# Patient Record
Sex: MALE | Race: WHITE | Employment: UNEMPLOYED | URBAN - NONMETROPOLITAN AREA
[De-identification: names, ages, dates, MRNs, and addresses within clinical notes are randomized per-mention and may not be internally consistent; named-entity substitution may affect disease eponyms.]

---

## 2020-08-28 ENCOUNTER — HOSPITAL ENCOUNTER (INPATIENT)
Age: 30
LOS: 2 days | Discharge: HOME | DRG: 885 | End: 2020-08-31
Attending: PSYCHIATRY & NEUROLOGY | Admitting: PSYCHIATRY & NEUROLOGY
Payer: COMMERCIAL

## 2020-08-28 LAB
ACETAMINOPHEN LEVEL: < 5 UG/ML (ref 0–20)
ALBUMIN SERPL-MCNC: 4.9 G/DL (ref 3.5–5.1)
ALP BLD-CCNC: 74 U/L (ref 38–126)
ALT SERPL-CCNC: 10 U/L (ref 11–66)
ANION GAP SERPL CALCULATED.3IONS-SCNC: 11 MEQ/L (ref 8–16)
AST SERPL-CCNC: 20 U/L (ref 5–40)
BASOPHILS # BLD: 0.4 %
BASOPHILS ABSOLUTE: 0 THOU/MM3 (ref 0–0.1)
BILIRUB SERPL-MCNC: 0.6 MG/DL (ref 0.3–1.2)
BUN BLDV-MCNC: 12 MG/DL (ref 7–22)
CALCIUM SERPL-MCNC: 9.7 MG/DL (ref 8.5–10.5)
CHLORIDE BLD-SCNC: 102 MEQ/L (ref 98–111)
CO2: 24 MEQ/L (ref 23–33)
CREAT SERPL-MCNC: 0.9 MG/DL (ref 0.4–1.2)
EOSINOPHIL # BLD: 1.2 %
EOSINOPHILS ABSOLUTE: 0.1 THOU/MM3 (ref 0–0.4)
ERYTHROCYTE [DISTWIDTH] IN BLOOD BY AUTOMATED COUNT: 12.4 % (ref 11.5–14.5)
ERYTHROCYTE [DISTWIDTH] IN BLOOD BY AUTOMATED COUNT: 43.9 FL (ref 35–45)
ETHYL ALCOHOL, SERUM: < 0.01 %
GFR SERPL CREATININE-BSD FRML MDRD: > 90 ML/MIN/1.73M2
GLUCOSE BLD-MCNC: 108 MG/DL (ref 70–108)
HCT VFR BLD CALC: 43.6 % (ref 42–52)
HEMOGLOBIN: 14.8 GM/DL (ref 14–18)
IMMATURE GRANS (ABS): 0.01 THOU/MM3 (ref 0–0.07)
IMMATURE GRANULOCYTES: 0.1 %
LYMPHOCYTES # BLD: 21.2 %
LYMPHOCYTES ABSOLUTE: 1.4 THOU/MM3 (ref 1–4.8)
MCH RBC QN AUTO: 32.7 PG (ref 26–33)
MCHC RBC AUTO-ENTMCNC: 33.9 GM/DL (ref 32.2–35.5)
MCV RBC AUTO: 96.5 FL (ref 80–94)
MONOCYTES # BLD: 5.9 %
MONOCYTES ABSOLUTE: 0.4 THOU/MM3 (ref 0.4–1.3)
NUCLEATED RED BLOOD CELLS: 0 /100 WBC
OSMOLALITY CALCULATION: 274.1 MOSMOL/KG (ref 275–300)
PLATELET # BLD: 237 THOU/MM3 (ref 130–400)
PMV BLD AUTO: 9.6 FL (ref 9.4–12.4)
POTASSIUM REFLEX MAGNESIUM: 3.9 MEQ/L (ref 3.5–5.2)
RBC # BLD: 4.52 MILL/MM3 (ref 4.7–6.1)
SALICYLATE, SERUM: < 0.3 MG/DL (ref 2–10)
SEG NEUTROPHILS: 71.2 %
SEGMENTED NEUTROPHILS ABSOLUTE COUNT: 4.8 THOU/MM3 (ref 1.8–7.7)
SODIUM BLD-SCNC: 137 MEQ/L (ref 135–145)
TOTAL CK: 126 U/L (ref 55–170)
TOTAL PROTEIN: 7.4 G/DL (ref 6.1–8)
TROPONIN T: < 0.01 NG/ML
TSH SERPL DL<=0.05 MIU/L-ACNC: 0.62 UIU/ML (ref 0.4–4.2)
WBC # BLD: 6.7 THOU/MM3 (ref 4.8–10.8)

## 2020-08-28 PROCEDURE — 82550 ASSAY OF CK (CPK): CPT

## 2020-08-28 PROCEDURE — 84484 ASSAY OF TROPONIN QUANT: CPT

## 2020-08-28 PROCEDURE — 85025 COMPLETE CBC W/AUTO DIFF WBC: CPT

## 2020-08-28 PROCEDURE — 80053 COMPREHEN METABOLIC PANEL: CPT

## 2020-08-28 PROCEDURE — G0480 DRUG TEST DEF 1-7 CLASSES: HCPCS

## 2020-08-28 PROCEDURE — 36415 COLL VENOUS BLD VENIPUNCTURE: CPT

## 2020-08-28 PROCEDURE — 84443 ASSAY THYROID STIM HORMONE: CPT

## 2020-08-28 PROCEDURE — 99284 EMERGENCY DEPT VISIT MOD MDM: CPT

## 2020-08-28 PROCEDURE — 99283 EMERGENCY DEPT VISIT LOW MDM: CPT

## 2020-08-29 ENCOUNTER — APPOINTMENT (OUTPATIENT)
Dept: CT IMAGING | Age: 30
DRG: 885 | End: 2020-08-29
Payer: COMMERCIAL

## 2020-08-29 PROBLEM — F29 PSYCHOSIS (HCC): Status: ACTIVE | Noted: 2020-08-29

## 2020-08-29 LAB
AMPHETAMINE+METHAMPHETAMINE URINE SCREEN: NEGATIVE
BARBITURATE QUANTITATIVE URINE: NEGATIVE
BENZODIAZEPINE QUANTITATIVE URINE: NEGATIVE
BILIRUBIN URINE: NEGATIVE
BLOOD, URINE: NEGATIVE
CANNABINOID QUANTITATIVE URINE: POSITIVE
CHARACTER, URINE: CLEAR
COCAINE METABOLITE QUANTITATIVE URINE: NEGATIVE
COLOR: YELLOW
GLUCOSE URINE: NEGATIVE MG/DL
KETONES, URINE: ABNORMAL
LEUKOCYTE ESTERASE, URINE: NEGATIVE
NITRITE, URINE: NEGATIVE
OPIATES, URINE: NEGATIVE
OXYCODONE: NEGATIVE
PH UA: 6.5 (ref 5–9)
PHENCYCLIDINE QUANTITATIVE URINE: NEGATIVE
PROTEIN UA: NEGATIVE
SPECIFIC GRAVITY, URINE: 1.03 (ref 1–1.03)
UROBILINOGEN, URINE: 2 EU/DL (ref 0–1)

## 2020-08-29 PROCEDURE — 70450 CT HEAD/BRAIN W/O DYE: CPT

## 2020-08-29 PROCEDURE — 81003 URINALYSIS AUTO W/O SCOPE: CPT

## 2020-08-29 PROCEDURE — 80307 DRUG TEST PRSMV CHEM ANLYZR: CPT

## 2020-08-29 PROCEDURE — 6370000000 HC RX 637 (ALT 250 FOR IP): Performed by: NURSE PRACTITIONER

## 2020-08-29 PROCEDURE — 6370000000 HC RX 637 (ALT 250 FOR IP): Performed by: PSYCHIATRY & NEUROLOGY

## 2020-08-29 PROCEDURE — 1240000000 HC EMOTIONAL WELLNESS R&B

## 2020-08-29 PROCEDURE — 90792 PSYCH DIAG EVAL W/MED SRVCS: CPT | Performed by: NURSE PRACTITIONER

## 2020-08-29 RX ORDER — TRAZODONE HYDROCHLORIDE 50 MG/1
50 TABLET ORAL NIGHTLY PRN
Status: DISCONTINUED | OUTPATIENT
Start: 2020-08-29 | End: 2020-08-31 | Stop reason: HOSPADM

## 2020-08-29 RX ORDER — NICOTINE 21 MG/24HR
1 PATCH, TRANSDERMAL 24 HOURS TRANSDERMAL DAILY
Status: DISCONTINUED | OUTPATIENT
Start: 2020-08-29 | End: 2020-08-31 | Stop reason: HOSPADM

## 2020-08-29 RX ORDER — ACETAMINOPHEN 325 MG/1
650 TABLET ORAL EVERY 4 HOURS PRN
Status: DISCONTINUED | OUTPATIENT
Start: 2020-08-29 | End: 2020-08-31 | Stop reason: HOSPADM

## 2020-08-29 RX ORDER — LORAZEPAM 1 MG/1
0.5 TABLET ORAL ONCE
Status: DISCONTINUED | OUTPATIENT
Start: 2020-08-29 | End: 2020-08-31 | Stop reason: HOSPADM

## 2020-08-29 RX ORDER — HYDROXYZINE HYDROCHLORIDE 25 MG/1
50 TABLET, FILM COATED ORAL 3 TIMES DAILY PRN
Status: DISCONTINUED | OUTPATIENT
Start: 2020-08-29 | End: 2020-08-31 | Stop reason: HOSPADM

## 2020-08-29 RX ORDER — IBUPROFEN 400 MG/1
400 TABLET ORAL EVERY 6 HOURS PRN
Status: DISCONTINUED | OUTPATIENT
Start: 2020-08-29 | End: 2020-08-29

## 2020-08-29 RX ORDER — RISPERIDONE 1 MG/1
1 TABLET, FILM COATED ORAL 2 TIMES DAILY
Status: DISCONTINUED | OUTPATIENT
Start: 2020-08-29 | End: 2020-08-30

## 2020-08-29 RX ORDER — MAGNESIUM HYDROXIDE/ALUMINUM HYDROXICE/SIMETHICONE 120; 1200; 1200 MG/30ML; MG/30ML; MG/30ML
30 SUSPENSION ORAL EVERY 6 HOURS PRN
Status: DISCONTINUED | OUTPATIENT
Start: 2020-08-29 | End: 2020-08-31 | Stop reason: HOSPADM

## 2020-08-29 RX ADMIN — RISPERIDONE 1 MG: 1 TABLET ORAL at 21:17

## 2020-08-29 RX ADMIN — ACETAMINOPHEN 650 MG: 325 TABLET ORAL at 22:03

## 2020-08-29 RX ADMIN — HYDROXYZINE HYDROCHLORIDE 50 MG: 25 TABLET ORAL at 21:17

## 2020-08-29 RX ADMIN — TRAZODONE HYDROCHLORIDE 50 MG: 50 TABLET ORAL at 21:17

## 2020-08-29 ASSESSMENT — PAIN DESCRIPTION - PAIN TYPE: TYPE: ACUTE PAIN

## 2020-08-29 ASSESSMENT — PAIN DESCRIPTION - ORIENTATION: ORIENTATION: LEFT

## 2020-08-29 ASSESSMENT — PAIN DESCRIPTION - FREQUENCY: FREQUENCY: INTERMITTENT

## 2020-08-29 ASSESSMENT — PAIN SCALES - GENERAL
PAINLEVEL_OUTOF10: 0
PAINLEVEL_OUTOF10: 5

## 2020-08-29 ASSESSMENT — SLEEP AND FATIGUE QUESTIONNAIRES
DIFFICULTY FALLING ASLEEP: YES
SLEEP PATTERN: DIFFICULTY FALLING ASLEEP
RESTFUL SLEEP: NO
DIFFICULTY STAYING ASLEEP: NO
DO YOU USE A SLEEP AID: NO
DIFFICULTY ARISING: NO
DO YOU HAVE DIFFICULTY SLEEPING: YES

## 2020-08-29 ASSESSMENT — PAIN DESCRIPTION - PROGRESSION: CLINICAL_PROGRESSION: NOT CHANGED

## 2020-08-29 ASSESSMENT — PAIN DESCRIPTION - DESCRIPTORS: DESCRIPTORS: ACHING;DISCOMFORT

## 2020-08-29 ASSESSMENT — PAIN - FUNCTIONAL ASSESSMENT: PAIN_FUNCTIONAL_ASSESSMENT: ACTIVITIES ARE NOT PREVENTED

## 2020-08-29 ASSESSMENT — PAIN DESCRIPTION - ONSET: ONSET: ON-GOING

## 2020-08-29 ASSESSMENT — LIFESTYLE VARIABLES: HISTORY_ALCOHOL_USE: COMMENT

## 2020-08-29 ASSESSMENT — PAIN DESCRIPTION - LOCATION: LOCATION: RIB CAGE

## 2020-08-29 NOTE — BH NOTE
1300 -Pt came up to the nurses station irritable and wanting to see the doctor or someone to get him out of here. Pt was told that the doctor on call will be here later, and he demanded to be able to talk to him on the phone since he is on call. Pt reported that he does not have his keys or his phone, and the hotel room he was staying in is no longer available. Pt continued to say that he is here against his will, and was explained how [facility] [place] work, and why he was admitted to the unit. Pt continued to state that the  that brought him here ruined his life as now he is unable to contact his daughter and her mother who are in [facility]. Pt did not respond to redirection and became increasingly irritable.

## 2020-08-29 NOTE — PATIENT CARE CONFERENCE
[place]   6. Will need a return to work slip or FMLA paper completion?  No       GOALS UPDATE:   Time frame for Short-Term Goals: ongoing     [facility], [address]

## 2020-08-29 NOTE — H&P
Psychiatry H&P              CC: Unspecified Psychosis    HPI:  Belinda Irwin is a 27year old male presented to ED per law enforcement under [address] [place]. Client is some what of a poor historian. Was reported to be at friends and was acting strange and police were called and client was naked upon their arrival. Having VH. And paranoia. Was  Not sure if had wrecked car. Client states to  This provider he and girlfriend wnt to [place] to bau a puppy and are from [place]., and reports he was so tired and kept drinking \"Red Bulls\" states they were passing through [place] where he thinks they got a hotel. States he could not sleep prior to admission. States he did sleep  In ED. Belinda Irwin is alert and oriented to person, place and time. Upon admission to E4 psychiatric unit:   Belinda Irwin appears with disorganized thoughts. Whispering to self even though denies AH/VH. currently. Appears with paranoia and is irritable. Denies feelings of harm towards self ort others. Reports appetite is \"ok. \" Labs reviewed drawn 8-28-20 reflect no critical abnormals. UDS done today is positive  for cannabis only. Denies having any support systems. States his family in in [facility]. Belinda Irwin would not give name of town he is from in [place]. States he was in [facility] in past     Past Medical Hx:  See ED note   States he might be allergic to Ibuprofen Denies any other drug allergies     Past Psychiatric Hx:  Denies any past psych hosptaliztions. Reports one past suicidal gestures years ago. Reports being on Ritalin, Adderall,     Family Hx:  Denies any family Hx of mental health or addiction issues. Social Hx:  TOBACCO:  Denies use of tobacco products  ETOH: Denies use   DRUGS: Reports cannabis use vague on frequency   MARITAL STATUS: Currently   since 2017  OCCUPATION: Unemployed  LEVEL OF EDUCATION: Report shaving high school diploma   LIVING SITUATION: Reports lives in [place] alone. Reports being from [place]. Reports having one daughter in custody of mother. MSE:  Level of consciousness: Alert  Appearance: in chair and fair grooming   Behavior/Motor: Guarded  Attitude toward examiner: semicooperative   Speech: Normal volume, Circumstantial   Mood: Irritable   Affect: Blunt   Thought processes: Some disorganization noted  Suicidal Ideation: Denies suicidal ideations  Homicidal ideation: Denies homicidal ideations  Delusions: Paranoid  Perceptual Disturbance: Denies AH/VH currently whispers to self. Cognition: Oriented to person, place, time   Concentration fair   Memory intact   Insight: Limited   Judgment: Limited    ROS:  Constitutional: Appears well-developed; No acute distress  HENT:   Head: Normocephalic and atraumatic. Negative for ear pain, congestion, rhinorrhea and neck pain. Eyes: Conjunctivae are normal.  no discharge noted from eyes bilat. . No scleral icterus. Neck: Normal range of motion. Pulmonary/Chest:  No respiratory distress or accessory muscle use, no wheezing. Abdominal: No distension. Musculoskeletal: Normal range of motion. He exhibits no edema. Negative for myalgias, back pain and arthralgias. Neurological: cranial nerves II-XII grossly in tact, normal gait and station. Negative for dizziness, weakness or headaches. Skin: Skin is warm and dry. Not diaphoretic. No erythema. Cardiovascular: Negative for chest pain, palpitations and leg swelling. Gastrointestinal: Negative for nausea, vomiting and diarrhea. Genitourinary: Negative for dysuria and frequency. Impression:  Unspecified Psychosis       Plan:  Admit to 4 psychiatric unit for symptom stabilization and medication management. Introduce to unit milieu, groups and individual therapies.   Refuses offer of any medication   Start Risperdal 1mg po BID for psychosis        Behavioral Services  Medicare Certification     Admission Day 1  I certify that this patient's inpatient psychiatric hospital admission is medically necessary for:    (1) treatment which could reasonably be expected to improve this patient's condition, or    (2) diagnostic study or its equivalent.        Physicians Signature: Electronically signed by Sincere Thacker, FELIX 3-

## 2020-08-29 NOTE — GROUP NOTE
Group Therapy Note    Date: 8/29/2020    Group Start Time: 8433  Group End Time: 7219  Group Topic: [facility] Adult Psych 4E    [address], [address]    Group Therapy Note    Attendees: 3         Pt did not attend goal group and community meeting. Pt received maximum encouragement to attend group. Pt participated in small 1:1 session to identify a daily goal.  Pts goal is \"get in contact with my daughter/ family,  Get out of here. \"    Signature:  Rozina Sprague

## 2020-08-29 NOTE — ED NOTES
Unable to obtain EKG. Pt curled in a ball in the bed, rocking back and forth. Pt has short episodes of crying out. Pt is rubbing stomach and when asked if he was in pain, pt screams \"what do you want from me?\". Pt looking all around the room in different directions and when asked what he is seeing, pt states \"everything. Cameras, sounds, things\". Pt refusing to remove shirt and refusing EKG and registration's questions at this time.       Cory Shen  08/28/20 8078

## 2020-08-29 NOTE — ED NOTES
ED to inpatient nurses report    Chief Complaint   Patient presents with    Psychiatric Evaluation    Hallucinations      Present to ED from home  LOC: alert to only name  Vital signs   Vitals:    08/28/20 2120 08/29/20 0022   BP: 134/89 129/80   Pulse: 140 90   Resp: 20 19   Temp: 98.9 °F (37.2 °C)    TempSrc: Axillary    SpO2: 98% 98%      Oxygen Baseline 98 RA    Current needs required none Bipap/Cpap No  LDAs:    Mobility: Independent  Pending ED orders: none  Present condition: stable    Electronically signed by Delbert Carter on 8/29/2020 at 6:36 AM       Delbert Carter  08/29/20 9214

## 2020-08-29 NOTE — PROGRESS NOTES
Spoke with Dr. Rony Christine. Patient to be admitted for inpatient treatment. Informed patients nurse. 4E will call back for patient report.

## 2020-08-29 NOTE — PROGRESS NOTES
Behavioral Health   Admission Note     Admission Type:   Admission Type: Involuntary    Reason for admission:  Reason for Admission: \"I don't know why I am here. Saleem Parrawin Nings I need to leave\"    PATIENT STRENGTHS:  Strengths: No significant Physical Illness    Patient Strengths and Limitations:  Limitations: Inappropriate/potentially harmful leisure interests    Addictive Behavior:   Addictive Behavior  In the past 3 months, have you felt or has someone told you that you have a problem with:  : None  Do you have a history of Chemical Use?: Comment(pt uses cannabis per record pt will not elaborate on usage history)  Do you have a history of Alcohol Use?: Comment(denies/pt guarded)  Do you have a history of Street Drug Abuse?: Comment(history of cannabis use per record-pt poor historian)  Histroy of Prescripton Drug Abuse?: Comment(wilner)    Medical Problems:   Past Medical History:   Diagnosis Date    Psychiatric problem        Status EXAM:  Status and Exam  Normal: No  Facial Expression: Hostile  Affect: Constricted  Level of Consciousness: Confused  Mood:Normal: No  Mood: Suspicious  Motor Activity:Normal: No  Motor Activity: Agitated  Interview Behavior: Uncooperative/Withdrawn  Preception: Mills to Person  Attention:Normal: No  Attention: Distractible, Unable to Concentrate  Thought Processes: Circumstantial  Thought Content:Normal: No  Thought Content: Preoccupations, Paranoia  Hallucinations:  Other (Comment)(pt denies hallucinations but is observed to be speaking with himself)  Delusions: (paranoid and suspicious)  Memory:Normal: No  Memory: Poor Recent, Poor Remote  Insight and Judgment: No  Insight and Judgment: Poor Judgment, Poor Insight  Present Suicidal Ideation: No  Present Homicidal Ideation: No    Pt admitted with followings belongings:  Dentures: None  Vision - Corrective Lenses: None  Hearing Aid: None  Jewelry: None  Body Piercings Removed: No  Clothing: Shirt, Footwear, Pants  Were All Patient Medications Collected?: Not Applicable  Other Valuables: Other (Comment)(hair brush/toothbrush)     Admission order obtained yes. Pt did not have cell phone,ID or car keys. Personal clothing placed in locker. Patient reports not taking any prescribed medications. Patient oriented to surroundings and program expectations and copy of patient rights given. Received admission packet: yes  Consents reviewed, but not signed . Patient verbalize understanding:  yes. Patient education on precautions: yes          Patient screened positive for suicide risk on CSSR-S (\"yes\" to question #4, 5, OR 6)  no. Physician notified of risk score. Orders received no new orders . 2 person skin assessment was not completed upon admission as pt refused    Explained patients right to have family, representative or physician notified of their admission. Patient has Declined for physician to be notified. Patient has Declined for family/representative to be notified. Provided pt with [employer] handout entitled \"Quitting Smoking. \"  Reviewed handout with pt addressing dangers of smoking, developing coping skills, and providing basic information about quitting. Pt response to counseling:  Pt voiced understanding    27 yr old male admitted to E4 under an [address] [place]. He was at first uncooperative to sign any consent forms feeling overwhelmed and paranoid but now is willing. Pt reports he is from [place] but his family (3 yr old daughter & wife are from [facility]). He shared he was helping a female friend who is from [address] [place] [address] [phone] puppies from [place]. He had not been sleeping and shared they obtained a room at the [facility]. His friend called police as he was acting bizarre and this pt was brought into [facility]. Pt has tried to call the [address] but they shared no one was in that room. Pt frustrated and worried as he does not have his phone, car keys or any knowledge where his car might be.  Pt spoke with campus security who was able to hear his concerns and called local [facility]. Pt received phone call from Emmie Price who informed pt his car was picked up in [facility]. He was informed his female friend had outstanding warrants and is currently in the [address] [place] [address] FDC and will be moved to [facility] on Monday. Pt's car had been towed to [facility] [phone] in [address] [facility] A Pt worried about the 5 pupples they purchased and asked the officer what happened - he was informed the Puppies were taken to [address] [facility] attempted to contact [address] [place] [address] but they are closed until Monday. Pt left message to have a return phone call regarding the status of the 5 puppies.           Meghna Rubin RN

## 2020-08-29 NOTE — ED NOTES
Pt resting in bed, eyes closed.  Respirs easy and unlabored at this time     Lakesha Solid  08/29/20 [address]

## 2020-08-29 NOTE — GROUP NOTE
Group Therapy Note    Date: 8/29/2020    Group Start Time: 1400  Group End Time: 1430  Group Topic: Recreational    [facility] Adult Psych 4E    Cassy Patricia, CTRS    Group Therapy Note    Attendees: 3         Pt did not attend 1400 recreation therapy mindfulness group. Pt received maximum encouragement provided to attend group. Independently leisure is available on the unit for pt to use as group alternative.     Signature:  Suhail Parikh

## 2020-08-29 NOTE — PLAN OF CARE
Problem: Role Relationship:  Goal: Ability to demonstrate positive changes in social behaviors and relationships will improve  Description: Ability to demonstrate positive changes in social behaviors and relationships will improve  Outcome: Ongoing  Note: Pt has not attended any of the groups that have been offered on the unit this shift. Pt has been seen out on the unit today but has had minimal interaction with peers. Pt will be encouraged to attend groups on the unit as well as interact with peers. Pt progress towards socialization goal is ongoing.

## 2020-08-29 NOTE — ED NOTES
Ativan held per Camryn Finley. Vital signs obtained and are within normal limits.  Imaging bedside at this time     Melvi Toth  08/29/20 8909

## 2020-08-29 NOTE — PROGRESS NOTES
Provisional Diagnosis:   Substance induced Psychosis     Risk, Psychosocial and Contextual Factors: DO     Current MH Treatment: DO      Present Suicidal Behavior:      Verbal: DO         Attempt:DO    Access to Weapons:  DO    Current Suicide Risk: Low, Moderate or High:  DO    Past Suicidal Behavior:       Verbal:DO    Attempt: DO    Self-Injurious/Self-Mutilation: None noted    Traumatic Event Within Past 2 Weeks:  DO     Current Abuse: DO    Legal: DO    Violence: DO    Protective Factors:  DO    Housing:  DO    CPAP/Oxygen/Ambulation Difficulties: DO    Basic Vital Signs Normal?: Check with Patients Nurse prior to Calling Psychiatry    Critical Labs?: Check with Patients Nurse prior to Calling Psychiatry    Clinical Summary:      Patient is a 27year old male who presents to the ED on [facility]. It was reported the pt was at a friends house acting strange and they called law enforcement out. Pt was naked when officers arrived to the home. Pt appears to be having visual hallucinations and appears to be very paranoid. Pt asked several times where he was at and when told he appeared not to know where the hospital is located. When clinician asked the pt if he was from [facility] he wouldn't respond. Pt asked if he wrecked a car and if that is why he is here. Pt would not answer any of clinicians questions and kept saying he wanted to sleep but has not been able to lay down. Pt has asked the  if he was dead, and asked him if he was a sleep. Pt also asked if he  in a car wreck. Pt admitted to ingesting a chunk of marijuana and reported the dispensary told him to. Pt has been crawling around on the floor and rubbing his stomach. Pt has wondered out of his room and into another pt's room and had to be redirected a couple of times. Homicidal thoughts and/or plans denied. No delusions noted. Level of Care Disposition:      Consulted with Alma Santiago.  Patient is medically  Consulted with  Nehemias. Patient to be. Hand off to clinician Radha King.

## 2020-08-29 NOTE — PROGRESS NOTES
[facility]HI Biopsychosocial Assessment    Current Level of Psychosocial Functioning     Independent                       XXX  Dependent    Minimal Assist     Comments:      Psychosocial High Risk Factors (check all that apply)    Unable to obtain meds          XXX  Chronic illness/pain              XXX  Substance abuse                 XXX  Lack of Family Support   Financial stress                    XXX  Isolation                                  XXX  Inadequate Community Resources  Suicide attempt(s)  Not taking medications   Victim of crime   Developmental Delay  Unable to manage personal needs    Age 72 or older   Homeless  No transportation   Readmission within 30 days  Unemployment                      XXX  Traumatic Event    Psychiatric Advanced Directive: Involuntary     Family to involve in treatment: None     Sexual Orientation:  Heterosexual     Patient Strengths: no major physical illness     Patient Barriers: Unemployed, no insurance     Opiate education provided: none    Safety plan: ongoing     CMHC/MH history: Denies     Plan of Care:  medication management, group/individual therapies, family meetings, psycho -education, treatment team meetings to assist with stabilization    Initial Discharge Plan:  Home     Clinical Summary:  Patient is 27 yr old male presenting on [facility] to Иван Tae Braun . Patient reports to live alone in [place]. Patient reports using marijuana but not other drugs or alcohol. Patient denies trauma history. Patient very suspicious and wanting to leave. Patient has history of service in the [employer]. Reports not having slept for days.

## 2020-08-29 NOTE — PROGRESS NOTES
Checked in on patient. Resting. Attempted to re-assess patient, patient pulled blanket over his face to continue resting. Spoke with patients medical provider. Patient is medically cleared. UDS positive for marijuana. Dr. Schaffer Massed paged at this time.

## 2020-08-29 NOTE — ED NOTES
Pt resting in bed, eyes closed. Respirs easy and unlabored at this time. Monitoring being continued by [facility] staff.       Viky Nelson  08/29/20 6283

## 2020-08-29 NOTE — ED PROVIDER NOTES
Addendum: Care is assumed at 6 AM.  Patient was medically cleared by previous provider. Patient coming admitted to psychiatry.      Valeria García, [place]  08/29/20 7353
unremarkable. Laboratory results were reassuring as well other than urine drug screen noting cannabis. Patient is medically stable at this time. He is evaluated by [facility] clinician. He is signed out at end of shift pending psychiatry recommendation for disposition. CRITICAL CARE:   None    CONSULTS:  ANDREW    PROCEDURES:  None    FINAL IMPRESSION      1. Substance-induced psychotic disorder [facility])          DISPOSITION/PLAN   Please see follow-up provider note for final disposition    PATIENT REFERRED TO:  No follow-up provider specified.     DISCHARGEMEDICATIONS:  New Prescriptions    No medications on file       (Please note that portions of this note were completedwith a voice recognition program.  Efforts were made to edit the dictations but occasionally words are mis-transcribed.)        Padmini Munoz PA-C  08/29/20 0600

## 2020-08-29 NOTE — ED TRIAGE NOTES
Pt comes in by police. A friend called police and stated she the pt was hallucinating and acting erratic. When police arrived he was naked and was hallucinating. He told police that he wanted to die but he now is denying that. He is a poor historian and only answers few questions. He is having mood swings in where he will get angry and yell and then start crying. He often does not know where he is at or what is going on. Police placed under [facility]. Police state the friend stated he has been up for over 25 hours.

## 2020-08-29 NOTE — BH NOTE
INPATIENT RECREATIONAL THERAPY  ADULT BEHAVIORAL SERVICES  EVALUATION    REFERRING PHYSICIAN:  Dr. Rony Christine  DIAGNOSIS:   Psychosis, unspecified  PRECAUTIONS:  Standard Precautions    HISTORY OF PRESENT ILLNESS/INJURY: Pt was brought in by [employer]. Pt was reported to have erratic behavior, and when [employer] arrived, pt was running around naked. Per pt friend, pt was also reported to have ingested a block of marijuana. While on the unit pt kept asking about where his phone and keys were, reporting that he was at a hotel with some lady he picked up from [place]. Pt reported to be her . Pt reported to this staff that he wanted her to call 911 because he couldn't sleep, and that he did not have thoughts to harm himself. PMH:  Please see medical chart for prior medical history, allergies, and medications. HISTORY OF PSYCHIATRIC TREATMENT: Pt denies any past psychiatric admissions, but reports one suicidal gestures years ago. Pt denies any outpatient provider. YOB: 1990  GENDER:  Male    MARITAL STATUS:  Pt reports being , but  since 2017  EMPLOYMENT STATUS:  Unemployed  LIVING SITUATION/SUPPORT:  Pt reports living alone in [facility]. Pt reports having one daughter, but she lives with her mother in [address]: Pt reports having his high school diploma.   MEDICATION/DRUG USE: Pt reports use of marijuana, but does not report on how often    LEISURE INTERESTS:  spending time with family and friends, watching TV/ Movies, listening to music, helping others  ACTIVITY PREFERENCE: individual or small group  ACTIVITY TYPES:  passive, active, indoor, outdoor  COGNITION: A&Ox3    COPING: pooor  ATTENTION: poor  RELAXATION: poor  SELF-ESTEEM: fair  MOTIVATION:  fair    SOCIAL SKILLS:  Poor-- pt was isolating in room  FRUSTRATION TOLERANCE:  Pt denies any hx of violent behavior  ATTENTION SEEKING: No attention seeking behaviors noted  COOPERATION: Pt cooperative  AFFECT: Pt displayed a flat and blunted affect  APPEARANCE: Pt displays fair grooming and hygiene and is dressed in blue hospital scrub attire    HEARING:  No impairments noted at this time  VISION:  No impairments noted at this time  VERBAL COMMUNICATION:  No impairments noted at this time  WRITTEN COMMUNICATION:  No impairments noted at this time    COORDINATION:  No impairments noted at this time  MOBILITY: Pt ambulates independently  GOALS: Pt will improve socialization and knowledge of coping skills through participation of at least two group therapy sessions, daily, following encouragement from staff.

## 2020-08-30 PROBLEM — F19.959 SUBSTANCE-INDUCED PSYCHOTIC DISORDER (HCC): Status: ACTIVE | Noted: 2020-08-29

## 2020-08-30 PROCEDURE — 1240000000 HC EMOTIONAL WELLNESS R&B

## 2020-08-30 PROCEDURE — 6370000000 HC RX 637 (ALT 250 FOR IP): Performed by: PSYCHIATRY & NEUROLOGY

## 2020-08-30 PROCEDURE — 6370000000 HC RX 637 (ALT 250 FOR IP): Performed by: NURSE PRACTITIONER

## 2020-08-30 PROCEDURE — 99231 SBSQ HOSP IP/OBS SF/LOW 25: CPT | Performed by: NURSE PRACTITIONER

## 2020-08-30 RX ORDER — RISPERIDONE 1 MG/1
1 TABLET, FILM COATED ORAL NIGHTLY
Status: DISCONTINUED | OUTPATIENT
Start: 2020-08-30 | End: 2020-08-31 | Stop reason: HOSPADM

## 2020-08-30 RX ADMIN — HYDROXYZINE HYDROCHLORIDE 50 MG: 25 TABLET ORAL at 20:48

## 2020-08-30 RX ADMIN — RISPERIDONE 1 MG: 1 TABLET ORAL at 20:48

## 2020-08-30 ASSESSMENT — PAIN DESCRIPTION - PROGRESSION: CLINICAL_PROGRESSION: NOT CHANGED

## 2020-08-30 ASSESSMENT — PAIN SCALES - GENERAL
PAINLEVEL_OUTOF10: 0
PAINLEVEL_OUTOF10: 0

## 2020-08-30 ASSESSMENT — PAIN DESCRIPTION - ONSET: ONSET: ON-GOING

## 2020-08-30 NOTE — GROUP NOTE
Group Therapy Note    Date: 8/30/2020    Group Start Time: 1000  Group End Time: 9650  Group Topic: Recreational    [facility] Adult Psych 4E    FARHAN Plasencia    Group Therapy Note    Attendees: 6         Pt did not attend 1000 recreation therapy music group session. Pt received maximum encouragement from staff to attend group. Handouts are available on the unit for independent use as recreation group alternative.     Signature:  José Miguel Mcfadden

## 2020-08-30 NOTE — PLAN OF CARE
reports he feels worried and sad about missing his family. Pt compliant with medications  8/30/2020 0209 by Tayla Emery RN  Outcome: Ongoing  Note: Patient reports that he ius sd and confused as to what transpired prior to coming to the hospital. Patient is worried and concern that he does not have his daughter's phone number reporting that he calls and talks to her every night. Goal: Ability to disclose and discuss suicidal ideas will improve  Description: Ability to disclose and discuss suicidal ideas will improve  8/30/2020 1345 by Keya Mcfarlane RN  Outcome: Ongoing  Note: Pt denies suicidal ideations  8/30/2020 0209 by Tayla Emery RN  Outcome: Met This Shift  Note: Patient denies suicidal thoughts this shift. Goal: Able to verbalize support systems  Description: Able to verbalize support systems  8/30/2020 1345 by Keya Mcfarlane RN  Outcome: Ongoing  Note: Pt reports his support lives in [facility]- his wife and daughter   8/30/2020 0209 by Tayla Emery RN  Outcome: Met This Shift  Note: Patient reports that his wife and daughter are supportive,. Goal: Absence of self-harm  Description: Absence of self-harm  8/30/2020 1345 by Keya Mcfarlane RN  Outcome: Ongoing  Note: Pt remains free of self harming behaviors  8/30/2020 0209 by Tayla Emery RN  Outcome: Met This Shift  Note: Patient remains free from self-harm this shift. Goal: Participates in care planning  Description: Participates in care planning  8/30/2020 1345 by Keya Mcfarlane RN  Outcome: Ongoing  Note: Encouraged to attend groups and participate in treatment. Pt was compliant with am medications  8/30/2020 0209 by Tayla mEery RN  Outcome: Ongoing  Note: Care plan reviewed with patient. Patient does not verbalize understanding of the plan of care and does contribute to goal setting .      Problem: Altered Mood, Manic Behavior:  Goal: Able to sleep  Description: Able to sleep  8/30/2020 1345 by Kaylah Thakur RN  Outcome: Ongoing  Note: Pt slept 7.5 hrs continuous sleep with compliance with pm medications to assist with sleep   8/30/2020 0209 by Carina Cruz RN  Outcome: Ongoing  Note: Patient given prn Trazodone to assist with sleep this night. Goal: Able to verbalize decrease in frequency and intensity of racing thoughts  Description: Able to verbalize decrease in frequency and intensity of racing thoughts  8/30/2020 1345 by Kaylah Thakur RN  Outcome: Ongoing  Note: Pt shared his racing thoughts are about obtaining his car, personal belongings and the 5 puppies  8/30/2020 0209 by Carina Cruz RN  Outcome: Ongoing  Note: Patient reports that he feels as though he cannot think related to confusion as to hospitalization  Goal: Ability to interact with others will improve  Description: Ability to interact with others will improve  8/30/2020 1345 by Kaylah Thakur RN  Outcome: Ongoing  8/30/2020 0209 by Carina Cruz RN  Outcome: Not Met This Shift  Note: Patient keeps to himself throughout the shift. Problem: KNOWLEDGE DEFICIT,EDUCATION,DISCHARGE PLAN  Goal: Knowledge - personal safety  8/30/2020 1345 by Kaylah Thakur RN  Outcome: Ongoing  Note: Education provided on the benefits and purpose of completing his safety plan  8/30/2020 0209 by Carina Cruz RN  Outcome: Not Met This Shift  Note: Patient has not established a safety plan for discharge. Problem: Pain:  Goal: Pain level will decrease  Description: Pain level will decrease  8/30/2020 1345 by Kaylah Thakur RN  Outcome: Ongoing  Note: Pt has not requested any pain medications - he was encouraged to reach out with staff should he have discomfort. Pt voiced understanding  8/30/2020 0209 by Carina Cruz RN  Outcome: Ongoing  Note: Patient reports having left rib pain this shift rating it at a #5. PRN Tylenol given  Goal: Control of acute pain  Description: Control of acute pain  8/30/2020 1345 by Uriel Calix RN  Outcome: Ongoing  8/30/2020 0209 by Cecilio Snider RN  Outcome: Ongoing  Note: Patient reports having left rib pain this shift rating it at a #5. PRN Tylenol given  Goal: Control of chronic pain  Description: Control of chronic pain  8/30/2020 1345 by Uriel Calix RN  Outcome: Ongoing  8/30/2020 0209 by Cecilio Snider RN  Outcome: Met This Shift  Note: NO complaints of chronic pain this shift. Problem: Depressive Behavior With or Without Suicide Precautions:  Goal: Patient specific goal  Description: Patient specific goal  8/30/2020 1345 by Uriel Calix RN  Outcome: Not Met This Shift  Note: Did not develop a goal  8/30/2020 0209 by Cecilio Snider RN  Outcome: Met This Shift  Note: Patient is working toward his goal to getin contact with his daughter. Care plan reviewed with patient. Patient does  verbalize understanding of the plan of care but did not contribute to goal setting.

## 2020-08-30 NOTE — PLAN OF CARE
Problem: Role Relationship:  Goal: Ability to demonstrate positive changes in social behaviors and relationships will improve  Description: Ability to demonstrate positive changes in social behaviors and relationships will improve  Outcome: Ongoing  Note: Pt has not attended any groups that have been offered on the unit this shift. Pt has been in his room most of the day but has been interacting with peers when out on the unit. Pt will be encouraged to attend groups and to come out on the unit and socialize with peers. Pt progress towards socialization goal is ongoing.

## 2020-08-30 NOTE — PROGRESS NOTES
Group Therapy Note    Date: 8/29/2020  Start Time: 2000  End Time:  2020      Type of Group: Wrap-Up      Patient's Goal:  To get in contact with my daughter    Notes:  Not met    Status After Intervention:  Unchanged    Participation Level: Minimal    Participation Quality: Attentive      Speech:  normal      Thought Process/Content: Linear      Affective Functioning: Congruent      Mood: anxious      Level of consciousness:  Alert and Oriented x4      Response to Learning: Able to verbalize current knowledge/experience      Endings: None Reported    Modes of Intervention: Support      Discipline Responsible: Registered Nurse      Signature:  Brayden Bailey RN

## 2020-08-30 NOTE — PROGRESS NOTES
Psychiatry Progress Note      CC: Unspecified Psychosis   Possible substance induced psychosis     Subjective    Progress:  Chicho Daigle reports feeling much better today. Chicho Daigle actually appears much better today. Thoughts appear more clear and organized. No paranoia noted. Reports Risperdal really helped him sleep well last night. Verified slept 7.5 hours continuous. Denies side effects from med. Chicho Daigle has not taken am dose of Risperdal  And feels really does not need am dose and would like to continue on HS dose only. Reports appetite is good. Denies going to any groups. Denies talking to anyone on phone. Reports his wife and 3 y/o daughter are his primary support people live in [facility]. Reports being from [address] he need sreeleased manasa h can get his car out of impound and has to get his puppies from [employer]. Objective    MSE:  Level of consciousness: Alert  Appearance: hospital attire, in chair and fair grooming   Behavior/Motor: no abnormalities noted   Attitude toward examiner: cooperative   Speech: Normal volume, goal directed, NRR  Mood: Euthymic  Affect: Reactive  Thought processes: Thoughts appear more clear and organized. Suicidal Ideation: Denies suicidal ideations  Homicidal ideation: Denies homicidal ideations  Delusions: No evidence of delusions is observed  Perceptual Disturbance: Denies AH/VH;  No overt  psychosis is observed. 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       Jimmy Callahan, FELIX  2-    Brief Psychotic Disorder    I concur with above clinical findings and plan of care

## 2020-08-30 NOTE — PLAN OF CARE
Problem: Depressive Behavior With or Without Suicide Precautions:  Goal: Ability to disclose and discuss suicidal ideas will improve  Description: Ability to disclose and discuss suicidal ideas will improve  Outcome: Met This Shift  Note: Patient denies suicidal thoughts this shift. Goal: Able to verbalize support systems  Description: Able to verbalize support systems  Outcome: Met This Shift  Note: Patient reports that his wife and daughter are supportive,. Goal: Absence of self-harm  Description: Absence of self-harm  Outcome: Met This Shift  Note: Patient remains free from self-harm this shift. Goal: Patient specific goal  Description: Patient specific goal  Outcome: Met This Shift  Note: Patient is working toward his goal to getin contact with his daughter. Problem: Pain:  Description: Pain management should include both nonpharmacologic and pharmacologic interventions. Goal: Control of chronic pain  Description: Control of chronic pain  Outcome: Met This Shift  Note: NO complaints of chronic pain this shift. Problem: Role Relationship:  Goal: Ability to demonstrate positive changes in social behaviors and relationships will improve  Description: Ability to demonstrate positive changes in social behaviors and relationships will improve  8/30/2020 0209 by Marianna Godwin RN  Outcome: Ongoing  8/29/2020 1537 by Miguel Ng  Outcome: Ongoing  Note: Pt has not attended any of the groups that have been offered on the unit this shift. Pt has been seen out on the unit today but has had minimal interaction with peers. Pt will be encouraged to attend groups on the unit as well as interact with peers. Pt progress towards socialization goal is ongoing.      Problem: Depressive Behavior With or Without Suicide Precautions:  Goal: Able to verbalize and/or display a decrease in depressive symptoms  Description: Able to verbalize and/or display a decrease in depressive symptoms  Outcome: Ongoing  Note: Patient reports that he ius sd and confused as to what transpired prior to coming to the hospital. Patient is worried and concern that he does not have his daughter's phone number reporting that he calls and talks to her every night. Goal: Participates in care planning  Description: Participates in care planning  Outcome: Ongoing  Note: Care plan reviewed with patient. Patient does not verbalize understanding of the plan of care and does contribute to goal setting . Problem: Altered Mood, Manic Behavior:  Goal: Able to sleep  Description: Able to sleep  Outcome: Ongoing  Note: Patient given prn Trazodone to assist with sleep this night. Goal: Able to verbalize decrease in frequency and intensity of racing thoughts  Description: Able to verbalize decrease in frequency and intensity of racing thoughts  Outcome: Ongoing  Note: Patient reports that he feels as though he cannot think related to confusion as to hospitalization  Goal: Ability to achieve adequate nutritional intake will improve  Description: Ability to achieve adequate nutritional intake will improve  Outcome: Ongoing  Note: Patient had a small snack this shift. Problem: Pain:  Description: Pain management should include both nonpharmacologic and pharmacologic interventions. Goal: Pain level will decrease  Description: Pain level will decrease  Outcome: Ongoing  Note: Patient reports having left rib pain this shift rating it at a #5. PRN Tylenol given  Goal: Control of acute pain  Description: Control of acute pain  Outcome: Ongoing  Note: Patient reports having left rib pain this shift rating it at a #5. PRN Tylenol given     Problem: Depressive Behavior With or Without Suicide Precautions:  Goal: Able to verbalize acceptance of life and situations over which he or she has no control  Description: Able to verbalize acceptance of life and situations over which he or she has no control  Outcome: Not Met This Shift  Note: Patient does not verbalize acceptance of life and the situations he has control over. Patient is frustrated and worried. Problem: Altered Mood, Manic Behavior:  Goal: Ability to interact with others will improve  Description: Ability to interact with others will improve  Outcome: Not Met This Shift  Note: Patient keeps to himself throughout the shift. Problem: KNOWLEDGE DEFICIT,EDUCATION,DISCHARGE PLAN  Goal: Knowledge - personal safety  Outcome: Not Met This Shift  Note: Patient has not established a safety plan for discharge.

## 2020-08-30 NOTE — GROUP NOTE
Group Therapy Note    Date: 8/30/2020    Group Start Time: 0930  Group End Time: 1000  Group Topic: [address] Adult Psych 4E    [address], [address]    Group Therapy Note    Attendees: 7         Pt did not attend 0930 goal group and community meeting. Pt received maximum encouragement from staff to attend group. 1:1 offered to pt as alternative to group.     Signature: Sasha Carlton

## 2020-08-31 VITALS
RESPIRATION RATE: 16 BRPM | OXYGEN SATURATION: 100 % | SYSTOLIC BLOOD PRESSURE: 118 MMHG | WEIGHT: 145 LBS | DIASTOLIC BLOOD PRESSURE: 62 MMHG | HEIGHT: 66 IN | BODY MASS INDEX: 23.3 KG/M2 | TEMPERATURE: 97.2 F | HEART RATE: 58 BPM

## 2020-08-31 PROCEDURE — 99239 HOSP IP/OBS DSCHRG MGMT >30: CPT | Performed by: PSYCHIATRY & NEUROLOGY

## 2020-08-31 PROCEDURE — 5130000000 HC BRIDGE APPOINTMENT

## 2020-08-31 RX ORDER — RISPERIDONE 1 MG/1
1 TABLET, FILM COATED ORAL NIGHTLY
Qty: 30 TABLET | Refills: 0 | Status: SHIPPED | OUTPATIENT
Start: 2020-08-31

## 2020-08-31 ASSESSMENT — PAIN SCALES - GENERAL: PAINLEVEL_OUTOF10: 0

## 2020-08-31 NOTE — GROUP NOTE
Group Therapy Note    Date: 8/31/2020    Group Start Time: 1000  Group End Time: 2307  Group Topic: Recreational    [facility] Adult Psych 4E    FARHAN Plasencia    Group Therapy Note    Attendees: 8         Patient's Goal:  Pt will improve socialization and knowledge of coping skills through participation of recreation therapy games group. Notes:  Pt was present during group. Pt had active participation in group, but needed maximum prompting and assistance playing [facility] [employer]. Pt was interactive with peers throughout the session. Status After Intervention:  Improved    Participation Level:  Active Listener and Interactive    Participation Quality: Appropriate, Attentive and Sharing      Speech:  normal      Thought Process/Content: Logical      Affective Functioning: Congruent      Mood: euthymic      Level of consciousness:  Alert, Oriented x4 and Attentive      Response to Learning: Able to verbalize current knowledge/experience, Able to verbalize/acknowledge new learning, Able to retain information, Capable of insight, Able to change behavior and Progressing to goal      Endings: None Reported    Modes of Intervention: Education, Support, Socialization, Exploration, Clarifying and Activity      Discipline Responsible: Psychoeducational Specialist      Signature:  FARHAN Rucker

## 2020-08-31 NOTE — PROGRESS NOTES
Behavioral Health   Discharge Note    Pt discharged with followings belongings:   Dentures: None  Vision - Corrective Lenses: None  Hearing Aid: None  Jewelry: None  Body Piercings Removed: No  Clothing: Shirt, Footwear, Pants  Were All Patient Medications Collected?: Not Applicable  Other Valuables: Other (Comment)(hair brush/toothbrush)   Valuables sent home with patient. Patient left department with transport . Discharged to home. \"An Important Message from [facility] About Your Rights\" form photocopy original from admission and provided to pt at discharge N. Patient education on aftercare instructions: yes  Bridge Appointment completed: Reviewed Discharge Instructions with patient. Patient verbalizes understanding and agreement with the discharge plan using the teachback method. Patient verbalize understanding of AVS:  yes.     Status EXAM upon discharge:  Status and Exam  Normal: No  Facial Expression: Brightened, Flat  Affect: Appropriate  Level of Consciousness: Alert  Mood:Normal: No  Mood: Anxious, Depressed  Motor Activity:Normal: Yes  Motor Activity: Decreased  Interview Behavior: Cooperative  Preception: [place] to Person,  [place] to Time, [place] to Place, [place] to Situation  Attention:Normal: Yes  Attention: Distractible  Thought Processes: Circumstantial  Thought Content:Normal: No  Thought Content: Preoccupations  Hallucinations: None  Delusions: No  Memory:Normal: No  Memory: Poor Recent  Insight and Judgment: No  Insight and Judgment: Poor Judgment, Poor Insight  Present Suicidal Ideation: No  Present Homicidal Ideation: No    Mariya Toure LPN

## 2020-08-31 NOTE — GROUP NOTE
Group Therapy Note    Date: 8/31/2020    Group Start Time: 0915  Group End Time: 0945  Group Topic: Community Meeting    [facility] Adult Psych 4E    [address], [facility]    Group Therapy Note    Attendees: 7         Patient's Goal:  \"Talk to the doctor. Leave. Get everything back on track. \"    Notes:  Pt progress is ongoing. Status After Intervention:  Improved    Participation Level:  Active Listener and Interactive    Participation Quality: Appropriate, Attentive and Sharing      Speech:  normal      Thought Process/Content: Logical      Affective Functioning: Congruent      Mood: euthymic      Level of consciousness:  Alert, Oriented x4 and Attentive      Response to Learning: Able to verbalize current knowledge/experience, Able to verbalize/acknowledge new learning, Able to retain information, Capable of insight, Able to change behavior and Progressing to goal      Endings: None Reported    Modes of Intervention: Education, Support, Socialization, Exploration, Clarifying, Activity and Limit-setting      Discipline Responsible: Psychoeducational Specialist      Signature:  [address], [address]

## 2020-08-31 NOTE — PLAN OF CARE
Problem: KNOWLEDGE DEFICIT,EDUCATION,DISCHARGE PLAN  Goal: Knowledge - personal safety  8/31/2020 1315 by Garth Velázquez LPN  Outcome: Completed  Note: 1. Warning signs that happen when I am distressed or experience harmful thoughts   just feel different  2. Activities that I can do to cope in a healthy way when I am stressed or distressed   talk to my daughter or her mother  3. List of roadblocks that keep me from using healthy coping skills  none  4.  List of my support persons  girlfriend, God    EMERGENCY CONTACTS:  -National suicide prevention life line 4-861-937-020-878-1709  -24 hour crisis line 1-723-HOPE (4309)  -Domestic violence hotline 0-814-590-SAFE (7759)  -Test CONNECT to 586165 to chat with a trained crisis counselor 24 hours/day 7 days a week  -CALL 911   8/31/2020 0349 by Rich Cartwright RN  Outcome: Ongoing  Note: Pt remained safe and free of harm
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
## 2020-08-31 NOTE — PROGRESS NOTES
CLINICAL PHARMACY NOTE: MEDS TO [address] Select Patient?: No  Total # of Prescriptions Filled: 2   The following medications were delivered to the patient:  Risperidone 0.25mg  Total # of Interventions Completed: 2  Time Spent (min): 30    Additional Documentation:

## 2020-08-31 NOTE — PROGRESS NOTES
This RN has reviewed and agrees with ZACH Werner LPN's data collection and has collaborated with this LPN regarding the patient's care plan.

## 2020-08-31 NOTE — PLAN OF CARE
Problem: Role Relationship:  Goal: Ability to demonstrate positive changes in social behaviors and relationships will improve  Description: Ability to demonstrate positive changes in social behaviors and relationships will improve  Outcome: Ongoing  Note: Good interaction with staff, out more on unit     Problem: Depressive Behavior With or Without Suicide Precautions:  Goal: Able to verbalize and/or display a decrease in depressive symptoms  Description: Able to verbalize and/or display a decrease in depressive symptoms  Outcome: Ongoing  Note: Pt reports depression about not talking to daughter and anxiety about how to get car and gas  Goal: Ability to disclose and discuss suicidal ideas will improve  Description: Ability to disclose and discuss suicidal ideas will improve  Outcome: Ongoing  Note: Pt denies self harm thoughts  Goal: Able to verbalize support systems  Description: Able to verbalize support systems  Outcome: Ongoing  Note: Pt reports positive support system  Goal: Absence of self-harm  Description: Absence of self-harm  Outcome: Ongoing  Note: Pt denies self harm thoughts  Goal: Patient specific goal  Description: Patient specific goal  Outcome: Ongoing  Note: Pt reports meeting goal of feeling better and possibly being discharged tomorrow     Problem: Altered Mood, Manic Behavior:  Goal: Able to verbalize decrease in frequency and intensity of racing thoughts  Description: Able to verbalize decrease in frequency and intensity of racing thoughts  Outcome: Ongoing  Note: Pt denies racing thoughts and able to concentrate  Goal: Ability to achieve adequate nutritional intake will improve  Description: Ability to achieve adequate nutritional intake will improve  Outcome: Ongoing  Note: Pt eating well and had 1005 snack  Goal: Ability to interact with others will improve  Description: Ability to interact with others will improve  Outcome: Ongoing  Note: Pt interacting well with staff and out on unit